# Patient Record
Sex: FEMALE | Race: BLACK OR AFRICAN AMERICAN | Employment: FULL TIME | URBAN - METROPOLITAN AREA
[De-identification: names, ages, dates, MRNs, and addresses within clinical notes are randomized per-mention and may not be internally consistent; named-entity substitution may affect disease eponyms.]

---

## 2019-08-08 ENCOUNTER — HOSPITAL ENCOUNTER (OUTPATIENT)
Dept: ULTRASOUND IMAGING | Age: 29
Discharge: HOME | End: 2019-08-08
Payer: MEDICAID

## 2019-08-08 DIAGNOSIS — N94.10 DYSPAREUNIA IN FEMALE: ICD-10-CM

## 2019-08-08 PROCEDURE — 93975 VASCULAR STUDY: CPT

## 2021-05-06 ENCOUNTER — OFFICE VISIT (OUTPATIENT)
Dept: FAMILY MEDICINE CLINIC | Facility: CLINIC | Age: 31
End: 2021-05-06

## 2021-05-06 VITALS
HEART RATE: 72 BPM | TEMPERATURE: 98 F | BODY MASS INDEX: 17.08 KG/M2 | SYSTOLIC BLOOD PRESSURE: 88 MMHG | WEIGHT: 87 LBS | HEIGHT: 60 IN | DIASTOLIC BLOOD PRESSURE: 55 MMHG | RESPIRATION RATE: 14 BRPM | OXYGEN SATURATION: 98 %

## 2021-05-06 DIAGNOSIS — F41.9 ANXIETY: Primary | ICD-10-CM

## 2021-05-06 DIAGNOSIS — F32.A DEPRESSION, UNSPECIFIED DEPRESSION TYPE: ICD-10-CM

## 2021-05-06 PROCEDURE — 99213 OFFICE O/P EST LOW 20 MIN: CPT | Performed by: FAMILY MEDICINE

## 2021-05-06 RX ORDER — PAROXETINE HYDROCHLORIDE 20 MG/1
20 TABLET, FILM COATED ORAL DAILY
Qty: 30 TAB | Refills: 2 | Status: SHIPPED | OUTPATIENT
Start: 2021-05-06 | End: 2021-08-25 | Stop reason: SDUPTHER

## 2021-05-06 RX ORDER — CYPROHEPTADINE HYDROCHLORIDE 4 MG/1
4 TABLET ORAL
Qty: 30 TAB | Refills: 2 | Status: SHIPPED | OUTPATIENT
Start: 2021-05-06 | End: 2021-06-05

## 2021-05-06 NOTE — PROGRESS NOTES
HPI  Edi Hearn is a 33 y.o. female being seen today for   Chief Complaint   Patient presents with    Physical   .follow up for this pt seen a few years ago for anxiety and depression with  panic attacks. She was tried on prozac with mild improvement then switched to paxil.    she states that she does not remember if the paxil worked better. Her stress got better and she has not taken meds in awhile. She also took cyproheptadine for appetite and she thinks that did help although it made her sleepy. When she gets anxious she loses her appetitie. She has lost some weight and would like to gain it back. Her teenage daughter is having mental health issues and that is causing her stress. She is still seeing her counselor. Denies SI or HI. Past Medical History:   Diagnosis Date    Anxiety     Asthma     Depression     Panic attacks          ROS  Patient states that she is feeling well. Denies complaints of chest pain, shortness of breath, swelling of legs, dizziness or weakness. she denies nausea, vomiting or diarrhea. Current Outpatient Medications   Medication Sig    PARoxetine (PaxiL) 20 mg tablet Take 1 Tab by mouth daily.  cyproheptadine (PERIACTIN) 4 mg tablet Take 1 Tab by mouth daily as needed (appetite) for up to 30 days.  ORTHO TRI-CYCLEN, 28, 0.18/0.215/0.25 mg-35 mcg (28) tablet     LORazepam (ATIVAN) 0.5 mg tablet Take 1 Tab by mouth every eight (8) hours as needed for Anxiety. Max Daily Amount: 1.5 mg. No current facility-administered medications for this visit. PE  Visit Vitals  BP (!) 88/55 (BP 1 Location: Left arm, BP Patient Position: Sitting, BP Cuff Size: Adult long)   Pulse 72   Temp 98 °F (36.7 °C) (Temporal)   Resp 14   Ht 5' (1.524 m)   Wt 87 lb (39.5 kg)   SpO2 98%   BMI 16.99 kg/m²        Alert and oriented with normal mood and affect. she is well developed and well nourished . Lungs are clear without wheezing.  Heart rate is regular without murmurs or jessica. There is no lower extremity edema. No results found for this or any previous visit. Assessment and Plan:        ICD-10-CM ICD-9-CM    1. Anxiety  F41.9 300.00    2.  Depression, unspecified depression type  F32.9 311        Restart meds  Follow up in a month or so    Maddie Escobar MD

## 2021-08-25 RX ORDER — PAROXETINE HYDROCHLORIDE 20 MG/1
20 TABLET, FILM COATED ORAL DAILY
Qty: 30 TABLET | Refills: 2 | Status: SHIPPED | OUTPATIENT
Start: 2021-08-25

## 2021-08-25 RX ORDER — CYPROHEPTADINE HYDROCHLORIDE 4 MG/1
4 TABLET ORAL
Qty: 30 TABLET | Refills: 1 | Status: SHIPPED | OUTPATIENT
Start: 2021-08-25 | End: 2021-11-23

## 2021-08-26 NOTE — TELEPHONE ENCOUNTER
Several attempts made to reach out to patient. Patient number is not dialing out. Will attempt to reach out to patient at a later date.

## 2021-08-30 NOTE — TELEPHONE ENCOUNTER
Spoke with patient. Patient scheduled for an appt on 09/16/21 at the [facility].

## 2021-11-18 ENCOUNTER — TELEPHONE (OUTPATIENT)
Dept: FAMILY MEDICINE CLINIC | Facility: CLINIC | Age: 31
End: 2021-11-18